# Patient Record
Sex: MALE | Race: WHITE
[De-identification: names, ages, dates, MRNs, and addresses within clinical notes are randomized per-mention and may not be internally consistent; named-entity substitution may affect disease eponyms.]

---

## 2018-01-20 ENCOUNTER — HOSPITAL ENCOUNTER (EMERGENCY)
Dept: HOSPITAL 31 - C.ER | Age: 58
Discharge: HOME | End: 2018-01-20
Payer: COMMERCIAL

## 2018-01-20 VITALS — OXYGEN SATURATION: 97 %

## 2018-01-20 VITALS
RESPIRATION RATE: 18 BRPM | HEART RATE: 95 BPM | SYSTOLIC BLOOD PRESSURE: 125 MMHG | TEMPERATURE: 98.1 F | DIASTOLIC BLOOD PRESSURE: 83 MMHG

## 2018-01-20 DIAGNOSIS — B34.9: Primary | ICD-10-CM

## 2018-01-20 NOTE — C.PDOC
History Of Present Illness





Mitchel Edwards is a 58 year old male presents to the emergency 

department complaining of subjective fever, cough and congestion onset for x3 

days. Patient denies any sick contact. He denies any nausea, vomiting, diarrhea 

or other medical complaints. 





PMD: None provided. 


Time Seen by Provider: 18 09:04


Chief Complaint (Nursing): Flu-like Symptoms


History Per: Patient


History/Exam Limitations: no limitations


Onset/Duration Of Symptoms: Days (x3)


Current Symptoms Are (Timing): Still Present


Location Of Pain: None


Sick Contacts (Context): None


Associated Symptoms: Fever (subjective), Cough, Nasal Congestion.  denies: 

Nausea, Vomiting, Diarrhea


Ear Symptoms: Bilateral: None





Past Medical History


Reviewed: Historical Data, Nursing Documentation, Vital Signs


Vital Signs: 


 Last Vital Signs











Temp  98.1 F   18 10:23


 


Pulse  95 H  18 10:23


 


Resp  18   18 10:23


 


BP  125/83   18 10:23


 


Pulse Ox  97   18 15:33














- Medical History


PMH: Kidney Stones, Chronic Kidney Disease


Surgical History: No Surg Hx


Family History: States: Unknown Family Hx





- Social History


Hx Tobacco Use: No


Hx Alcohol Use: No


Hx Substance Use: No





- Immunization History


Hx Tetanus Toxoid Vaccination: No


Hx Influenza Vaccination: No


Hx Pneumococcal Vaccination: No





Review Of Systems


Except As Marked, All Systems Reviewed And Found Negative.


Constitutional: Positive for: Fever (subjective)


ENT: Positive for: Nose Congestion


Respiratory: Positive for: Cough


Gastrointestinal: Negative for: Nausea, Vomiting, Diarrhea





Physical Exam





- Physical Exam


Appears: Other (Ill)


Skin: Normal Color, Warm, Dry


Head: Atraumatic, Normacephalic


Eye(s): bilateral: Normal Inspection, PERRL, EOMI


Ear(s): Bilateral: Normal


Nose: Normal


Throat: Normal


Neck: Normal ROM, Supple


Cardiovascular: Rhythm Regular, No Murmur


Respiratory: Normal Breath Sounds (clear b/l), No Wheezing


Gastrointestinal/Abdominal: Normal Exam, Soft, No Tenderness


Extremity: Normal ROM, No Pedal Edema, No Deformity, No Swelling


Neurological/Psych: Oriented x3 (awake)





ED Course And Treatment


O2 Sat by Pulse Oximetry: 97 (RA)


Pulse Ox Interpretation: Normal


Progress Note: Initial Plan:  --Chest two views (PA/LAT) [RAD].  --Influenza A 

B.  --reevaluation.  On re-evaluation patient feels better, not toxic looking, 

no meningeal signs, CXR was negative, Influenza negative. Patient was d/c home 

on symptomatic therapy, recommended f/u with PMD/Clinic within 2-3 days.


Reassessment Condition: Improved





Medical Decision Making


Medical Decision Makin:25


Chest X-Ray


FINDINGS:





LUNGS:


No active pulmonary disease.





PLEURA:


No significant pleural effusion identified. No pneumothorax apparent.





CARDIOVASCULAR:


Normal.





OSSEOUS STRUCTURES:


No significant abnormalities.





VISUALIZED UPPER ABDOMEN:


Normal.





OTHER FINDINGS:


None.





IMPRESSION:


No active disease.





Disposition





- Disposition


Disposition: HOME/ ROUTINE


Disposition Time: 10:06


Condition: STABLE


Additional Instructions: 


Follow up with PMD/Clinic within 1-2 days. Return to ED if feel worse.


Prescriptions: 


Fluticasone Propionate [Flonase] 1 spr NS BID #1 spr


Ibuprofen [Motrin Tab] 600 mg PO Q8 #30 tab


Benzonatate [Tessalon Perles] 2 tab PO TID #60 sgl


Instructions:  Viral Syndrome (ED)


Forms:  [facility] (English)


Print Language: [place]





- Clinical Impression


Clinical Impression: 


 Viral syndrome








- Scribe Statement





Mitchel Martinez








All medical record entries made by the Scribe were at my direction and 

personally dictated by me. I have reviewed the chart and agree that the record 

accurately reflects my personal performance of the history, physical exam, 

medical decision making, and the department course for this patient. I have 

also personally directed, reviewed, and agree with the discharge instructions 

and disposition.

## 2018-01-20 NOTE — RAD
HISTORY:

cough/fever  



COMPARISON:

10/29/2017



TECHNIQUE:

Chest PA and lateral



FINDINGS:



LUNGS:

No active pulmonary disease.



PLEURA:

No significant pleural effusion identified. No pneumothorax apparent.



CARDIOVASCULAR:

Normal.



OSSEOUS STRUCTURES:

No significant abnormalities.



VISUALIZED UPPER ABDOMEN:

Normal.



OTHER FINDINGS:

None.



IMPRESSION:

No active disease.